# Patient Record
Sex: FEMALE | Race: WHITE | NOT HISPANIC OR LATINO | Employment: UNEMPLOYED | URBAN - METROPOLITAN AREA
[De-identification: names, ages, dates, MRNs, and addresses within clinical notes are randomized per-mention and may not be internally consistent; named-entity substitution may affect disease eponyms.]

---

## 2023-10-25 ENCOUNTER — LAB (OUTPATIENT)
Dept: LAB | Facility: LAB | Age: 48
End: 2023-10-25
Payer: COMMERCIAL

## 2023-10-25 DIAGNOSIS — Z13.220 ENCOUNTER FOR SCREENING FOR LIPOID DISORDERS: ICD-10-CM

## 2023-10-25 DIAGNOSIS — F41.9 ANXIETY DISORDER, UNSPECIFIED: ICD-10-CM

## 2023-10-25 DIAGNOSIS — E55.9 VITAMIN D DEFICIENCY, UNSPECIFIED: ICD-10-CM

## 2023-10-25 DIAGNOSIS — F41.9 ANXIETY DISORDER, UNSPECIFIED: Primary | ICD-10-CM

## 2023-10-25 DIAGNOSIS — Z00.00 ENCOUNTER FOR GENERAL ADULT MEDICAL EXAMINATION WITHOUT ABNORMAL FINDINGS: ICD-10-CM

## 2023-10-25 LAB
25(OH)D3 SERPL-MCNC: 20 NG/ML (ref 30–100)
ALBUMIN SERPL BCP-MCNC: 4.5 G/DL (ref 3.4–5)
ALP SERPL-CCNC: 85 U/L (ref 33–110)
ALT SERPL W P-5'-P-CCNC: 14 U/L (ref 7–45)
ANION GAP SERPL CALC-SCNC: 12 MMOL/L (ref 10–20)
AST SERPL W P-5'-P-CCNC: 18 U/L (ref 9–39)
BASOPHILS # BLD AUTO: 0.05 X10*3/UL (ref 0–0.1)
BASOPHILS NFR BLD AUTO: 0.6 %
BILIRUB SERPL-MCNC: 0.6 MG/DL (ref 0–1.2)
BUN SERPL-MCNC: 16 MG/DL (ref 6–23)
CALCIUM SERPL-MCNC: 9.2 MG/DL (ref 8.6–10.3)
CHLORIDE SERPL-SCNC: 108 MMOL/L (ref 98–107)
CHOLEST SERPL-MCNC: 200 MG/DL (ref 0–199)
CHOLESTEROL/HDL RATIO: 3.4
CO2 SERPL-SCNC: 25 MMOL/L (ref 21–32)
CREAT SERPL-MCNC: 0.74 MG/DL (ref 0.5–1.05)
EOSINOPHIL # BLD AUTO: 0.19 X10*3/UL (ref 0–0.7)
EOSINOPHIL NFR BLD AUTO: 2.3 %
ERYTHROCYTE [DISTWIDTH] IN BLOOD BY AUTOMATED COUNT: 14.1 % (ref 11.5–14.5)
GFR SERPL CREATININE-BSD FRML MDRD: >90 ML/MIN/1.73M*2
GLUCOSE SERPL-MCNC: 90 MG/DL (ref 74–99)
HCT VFR BLD AUTO: 43.9 % (ref 36–46)
HDLC SERPL-MCNC: 58.8 MG/DL
HGB BLD-MCNC: 14.9 G/DL (ref 12–16)
IMM GRANULOCYTES # BLD AUTO: 0.02 X10*3/UL (ref 0–0.7)
IMM GRANULOCYTES NFR BLD AUTO: 0.2 % (ref 0–0.9)
LDLC SERPL CALC-MCNC: 129 MG/DL
LYMPHOCYTES # BLD AUTO: 2.56 X10*3/UL (ref 1.2–4.8)
LYMPHOCYTES NFR BLD AUTO: 30.6 %
MCH RBC QN AUTO: 31.2 PG (ref 26–34)
MCHC RBC AUTO-ENTMCNC: 33.9 G/DL (ref 32–36)
MCV RBC AUTO: 92 FL (ref 80–100)
MONOCYTES # BLD AUTO: 0.35 X10*3/UL (ref 0.1–1)
MONOCYTES NFR BLD AUTO: 4.2 %
NEUTROPHILS # BLD AUTO: 5.19 X10*3/UL (ref 1.2–7.7)
NEUTROPHILS NFR BLD AUTO: 62.1 %
NON HDL CHOLESTEROL: 141 MG/DL (ref 0–149)
NRBC BLD-RTO: 0 /100 WBCS (ref 0–0)
PLATELET # BLD AUTO: 243 X10*3/UL (ref 150–450)
PMV BLD AUTO: 9.4 FL (ref 7.5–11.5)
POTASSIUM SERPL-SCNC: 3.9 MMOL/L (ref 3.5–5.3)
PROT SERPL-MCNC: 6.9 G/DL (ref 6.4–8.2)
RBC # BLD AUTO: 4.78 X10*6/UL (ref 4–5.2)
SODIUM SERPL-SCNC: 141 MMOL/L (ref 136–145)
TRIGL SERPL-MCNC: 63 MG/DL (ref 0–149)
TSH SERPL-ACNC: 0.89 MIU/L (ref 0.44–3.98)
VIT B12 SERPL-MCNC: 294 PG/ML (ref 211–911)
VLDL: 13 MG/DL (ref 0–40)
WBC # BLD AUTO: 8.4 X10*3/UL (ref 4.4–11.3)

## 2023-10-25 PROCEDURE — 36415 COLL VENOUS BLD VENIPUNCTURE: CPT

## 2023-10-25 PROCEDURE — 84443 ASSAY THYROID STIM HORMONE: CPT

## 2023-10-25 PROCEDURE — 80061 LIPID PANEL: CPT

## 2023-10-25 PROCEDURE — 85025 COMPLETE CBC W/AUTO DIFF WBC: CPT

## 2023-10-25 PROCEDURE — 82306 VITAMIN D 25 HYDROXY: CPT

## 2023-10-25 PROCEDURE — 82607 VITAMIN B-12: CPT

## 2023-10-25 PROCEDURE — 80053 COMPREHEN METABOLIC PANEL: CPT

## 2023-11-01 ENCOUNTER — OFFICE VISIT (OUTPATIENT)
Dept: PRIMARY CARE | Facility: CLINIC | Age: 48
End: 2023-11-01
Payer: COMMERCIAL

## 2023-11-01 VITALS
HEIGHT: 64 IN | HEART RATE: 97 BPM | SYSTOLIC BLOOD PRESSURE: 108 MMHG | BODY MASS INDEX: 21.65 KG/M2 | RESPIRATION RATE: 16 BRPM | OXYGEN SATURATION: 98 % | WEIGHT: 126.8 LBS | DIASTOLIC BLOOD PRESSURE: 68 MMHG

## 2023-11-01 DIAGNOSIS — Z13.1 SCREENING FOR DIABETES MELLITUS: ICD-10-CM

## 2023-11-01 DIAGNOSIS — K21.9 GASTROESOPHAGEAL REFLUX DISEASE WITHOUT ESOPHAGITIS: ICD-10-CM

## 2023-11-01 DIAGNOSIS — F41.9 ANXIETY AND DEPRESSION: ICD-10-CM

## 2023-11-01 DIAGNOSIS — R79.89 LOW VITAMIN B12 LEVEL: ICD-10-CM

## 2023-11-01 DIAGNOSIS — E78.5 HYPERLIPIDEMIA, UNSPECIFIED HYPERLIPIDEMIA TYPE: ICD-10-CM

## 2023-11-01 DIAGNOSIS — E55.9 VITAMIN D DEFICIENCY: ICD-10-CM

## 2023-11-01 DIAGNOSIS — F32.A ANXIETY AND DEPRESSION: ICD-10-CM

## 2023-11-01 DIAGNOSIS — F17.210 TOBACCO DEPENDENCE DUE TO CIGARETTES: ICD-10-CM

## 2023-11-01 DIAGNOSIS — Z00.00 ANNUAL PHYSICAL EXAM: Primary | ICD-10-CM

## 2023-11-01 DIAGNOSIS — D64.9 ANEMIA, UNSPECIFIED TYPE: ICD-10-CM

## 2023-11-01 DIAGNOSIS — Z13.29 SCREENING FOR THYROID DISORDER: ICD-10-CM

## 2023-11-01 PROBLEM — G89.29 CHRONIC HEADACHES: Status: ACTIVE | Noted: 2023-11-01

## 2023-11-01 PROBLEM — G25.81 RESTLESS LEGS SYNDROME (RLS): Status: ACTIVE | Noted: 2023-11-01

## 2023-11-01 PROBLEM — J30.2 SEASONAL ALLERGIES: Status: ACTIVE | Noted: 2023-11-01

## 2023-11-01 PROBLEM — R51.9 CHRONIC HEADACHES: Status: ACTIVE | Noted: 2023-11-01

## 2023-11-01 PROBLEM — K12.2 INFECTION OF MOUTH: Status: ACTIVE | Noted: 2023-11-01

## 2023-11-01 PROBLEM — B35.1 ONYCHOMYCOSIS OF TOENAIL: Status: ACTIVE | Noted: 2023-11-01

## 2023-11-01 PROCEDURE — 4004F PT TOBACCO SCREEN RCVD TLK: CPT | Performed by: NURSE PRACTITIONER

## 2023-11-01 PROCEDURE — 99406 BEHAV CHNG SMOKING 3-10 MIN: CPT | Performed by: NURSE PRACTITIONER

## 2023-11-01 PROCEDURE — 99396 PREV VISIT EST AGE 40-64: CPT | Performed by: NURSE PRACTITIONER

## 2023-11-01 PROCEDURE — 99214 OFFICE O/P EST MOD 30 MIN: CPT | Performed by: NURSE PRACTITIONER

## 2023-11-01 RX ORDER — IBUPROFEN 600 MG/1
600 TABLET ORAL 4 TIMES DAILY
COMMUNITY
Start: 2020-12-01

## 2023-11-01 RX ORDER — ACETAMINOPHEN 500 MG
1000 TABLET ORAL EVERY 4 HOURS PRN
COMMUNITY
Start: 2011-03-04

## 2023-11-01 ASSESSMENT — ANXIETY QUESTIONNAIRES
IF YOU CHECKED OFF ANY PROBLEMS ON THIS QUESTIONNAIRE, HOW DIFFICULT HAVE THESE PROBLEMS MADE IT FOR YOU TO DO YOUR WORK, TAKE CARE OF THINGS AT HOME, OR GET ALONG WITH OTHER PEOPLE: NOT DIFFICULT AT ALL
3. WORRYING TOO MUCH ABOUT DIFFERENT THINGS: NOT AT ALL
6. BECOMING EASILY ANNOYED OR IRRITABLE: NOT AT ALL
1. FEELING NERVOUS, ANXIOUS, OR ON EDGE: NOT AT ALL
4. TROUBLE RELAXING: NOT AT ALL
7. FEELING AFRAID AS IF SOMETHING AWFUL MIGHT HAPPEN: NOT AT ALL
5. BEING SO RESTLESS THAT IT IS HARD TO SIT STILL: NOT AT ALL
2. NOT BEING ABLE TO STOP OR CONTROL WORRYING: NOT AT ALL
GAD7 TOTAL SCORE: 0

## 2023-11-01 ASSESSMENT — COLUMBIA-SUICIDE SEVERITY RATING SCALE - C-SSRS
6. HAVE YOU EVER DONE ANYTHING, STARTED TO DO ANYTHING, OR PREPARED TO DO ANYTHING TO END YOUR LIFE?: NO
2. HAVE YOU ACTUALLY HAD ANY THOUGHTS OF KILLING YOURSELF?: NO
1. IN THE PAST MONTH, HAVE YOU WISHED YOU WERE DEAD OR WISHED YOU COULD GO TO SLEEP AND NOT WAKE UP?: NO

## 2023-11-01 ASSESSMENT — PATIENT HEALTH QUESTIONNAIRE - PHQ9
1. LITTLE INTEREST OR PLEASURE IN DOING THINGS: NOT AT ALL
2. FEELING DOWN, DEPRESSED OR HOPELESS: NOT AT ALL
SUM OF ALL RESPONSES TO PHQ9 QUESTIONS 1 AND 2: 0

## 2023-11-01 ASSESSMENT — ENCOUNTER SYMPTOMS
DEPRESSION: 0
OCCASIONAL FEELINGS OF UNSTEADINESS: 0
LOSS OF SENSATION IN FEET: 0

## 2023-11-01 NOTE — PATIENT INSTRUCTIONS
Start vitamin B12 500mcg daily and vitamin D 10,000 units weekly. Complete labs a week prior to one year follow up.

## 2023-11-01 NOTE — PROGRESS NOTES
"Subjective   Patient ID: Svetlana Nobles is a 48 y.o. female who presents for Well Women Visit.    Patient is following up for annual physical exam and lab results review.  Lab results shows low vitamin D level at 20, low normal vitamin B12 level at 294, slightly elevated LDL cholesterol 129.  She is an everyday cigarette smoker not willing to quit.  Advises that she will establish with OB/GYN for routine female exam and testing.  Advises she will consider screening for colorectal cancer when she turns 51.  Advises she has no acute medical complaint.         Review of Systems   All other systems reviewed and are negative.      Objective   /68   Pulse 97   Resp 16   Ht 1.626 m (5' 4\")   Wt 57.5 kg (126 lb 12.8 oz)   SpO2 98%   BMI 21.77 kg/m²     Physical Exam  Constitutional:       Appearance: Normal appearance. She is normal weight.   HENT:      Head: Normocephalic and atraumatic.      Right Ear: External ear normal.      Left Ear: External ear normal.      Nose: Nose normal.      Mouth/Throat:      Mouth: Mucous membranes are moist.   Cardiovascular:      Rate and Rhythm: Normal rate and regular rhythm.      Pulses: Normal pulses.      Heart sounds: Normal heart sounds.   Pulmonary:      Effort: Pulmonary effort is normal.      Breath sounds: Normal breath sounds.   Abdominal:      General: Abdomen is flat. Bowel sounds are normal.      Palpations: Abdomen is soft.   Musculoskeletal:      Cervical back: Neck supple.   Skin:     General: Skin is warm and dry.   Neurological:      General: No focal deficit present.      Mental Status: She is alert and oriented to person, place, and time.   Psychiatric:         Mood and Affect: Mood normal.         Behavior: Behavior normal.         Thought Content: Thought content normal.         Judgment: Judgment normal.         Assessment/Plan   Problem List Items Addressed This Visit    None  Visit Diagnoses       Vitamin D deficiency    -  Primary    Relevant Orders    " Vitamin B12    Low vitamin B12 level        Relevant Orders    Vitamin D 25-Hydroxy,Total (for eval of Vitamin D levels)    Hyperlipidemia, unspecified hyperlipidemia type        Relevant Orders    Lipid panel    Screening for thyroid disorder        Relevant Orders    TSH with reflex to Free T4 if abnormal    Anemia, unspecified type        Relevant Orders    CBC and Auto Differential    Screening for diabetes mellitus        Relevant Orders    Comprehensive Metabolic Panel    Annual physical exam        Relevant Orders    Follow Up In [facility] - PCP - Established

## 2024-05-23 ENCOUNTER — HOSPITAL ENCOUNTER (OUTPATIENT)
Dept: RADIOLOGY | Facility: CLINIC | Age: 49
Discharge: HOME | End: 2024-05-23
Payer: COMMERCIAL

## 2024-05-23 ENCOUNTER — OFFICE VISIT (OUTPATIENT)
Dept: PRIMARY CARE | Facility: CLINIC | Age: 49
End: 2024-05-23
Payer: COMMERCIAL

## 2024-05-23 VITALS
RESPIRATION RATE: 16 BRPM | HEIGHT: 64 IN | OXYGEN SATURATION: 98 % | DIASTOLIC BLOOD PRESSURE: 72 MMHG | WEIGHT: 124.8 LBS | SYSTOLIC BLOOD PRESSURE: 122 MMHG | HEART RATE: 88 BPM | BODY MASS INDEX: 21.31 KG/M2

## 2024-05-23 DIAGNOSIS — R11.2 NAUSEA AND VOMITING, UNSPECIFIED VOMITING TYPE: ICD-10-CM

## 2024-05-23 DIAGNOSIS — K21.9 GASTROESOPHAGEAL REFLUX DISEASE WITHOUT ESOPHAGITIS: Primary | ICD-10-CM

## 2024-05-23 DIAGNOSIS — R11.2 NAUSEA AND VOMITING, UNSPECIFIED VOMITING TYPE: Primary | ICD-10-CM

## 2024-05-23 DIAGNOSIS — Z00.00 ANNUAL PHYSICAL EXAM: ICD-10-CM

## 2024-05-23 PROCEDURE — 4004F PT TOBACCO SCREEN RCVD TLK: CPT | Performed by: NURSE PRACTITIONER

## 2024-05-23 PROCEDURE — 2550000001 HC RX 255 CONTRASTS: Performed by: NURSE PRACTITIONER

## 2024-05-23 PROCEDURE — 99213 OFFICE O/P EST LOW 20 MIN: CPT | Performed by: NURSE PRACTITIONER

## 2024-05-23 PROCEDURE — 74150 CT ABDOMEN W/O CONTRAST: CPT | Performed by: RADIOLOGY

## 2024-05-23 PROCEDURE — 74150 CT ABDOMEN W/O CONTRAST: CPT

## 2024-05-23 PROCEDURE — A9698 NON-RAD CONTRAST MATERIALNOC: HCPCS | Performed by: NURSE PRACTITIONER

## 2024-05-23 RX ORDER — PANTOPRAZOLE SODIUM 40 MG/1
40 TABLET, DELAYED RELEASE ORAL 2 TIMES DAILY
Qty: 84 TABLET | Refills: 0 | Status: SHIPPED | OUTPATIENT
Start: 2024-05-23 | End: 2024-07-04

## 2024-05-23 RX ADMIN — IOHEXOL 500 ML: 12 SOLUTION ORAL at 11:36

## 2024-05-23 ASSESSMENT — ANXIETY QUESTIONNAIRES
GAD7 TOTAL SCORE: 0
3. WORRYING TOO MUCH ABOUT DIFFERENT THINGS: NOT AT ALL
1. FEELING NERVOUS, ANXIOUS, OR ON EDGE: NOT AT ALL
IF YOU CHECKED OFF ANY PROBLEMS ON THIS QUESTIONNAIRE, HOW DIFFICULT HAVE THESE PROBLEMS MADE IT FOR YOU TO DO YOUR WORK, TAKE CARE OF THINGS AT HOME, OR GET ALONG WITH OTHER PEOPLE: NOT DIFFICULT AT ALL
4. TROUBLE RELAXING: NOT AT ALL
6. BECOMING EASILY ANNOYED OR IRRITABLE: NOT AT ALL
5. BEING SO RESTLESS THAT IT IS HARD TO SIT STILL: NOT AT ALL
7. FEELING AFRAID AS IF SOMETHING AWFUL MIGHT HAPPEN: NOT AT ALL
2. NOT BEING ABLE TO STOP OR CONTROL WORRYING: NOT AT ALL

## 2024-05-23 ASSESSMENT — ENCOUNTER SYMPTOMS
DEPRESSION: 0
VOMITING: 1
LOSS OF SENSATION IN FEET: 0
OCCASIONAL FEELINGS OF UNSTEADINESS: 0
NAUSEA: 1

## 2024-05-23 ASSESSMENT — PATIENT HEALTH QUESTIONNAIRE - PHQ9
2. FEELING DOWN, DEPRESSED OR HOPELESS: NOT AT ALL
1. LITTLE INTEREST OR PLEASURE IN DOING THINGS: NOT AT ALL
SUM OF ALL RESPONSES TO PHQ9 QUESTIONS 1 AND 2: 0

## 2024-05-23 ASSESSMENT — COLUMBIA-SUICIDE SEVERITY RATING SCALE - C-SSRS
2. HAVE YOU ACTUALLY HAD ANY THOUGHTS OF KILLING YOURSELF?: NO
1. IN THE PAST MONTH, HAVE YOU WISHED YOU WERE DEAD OR WISHED YOU COULD GO TO SLEEP AND NOT WAKE UP?: NO
6. HAVE YOU EVER DONE ANYTHING, STARTED TO DO ANYTHING, OR PREPARED TO DO ANYTHING TO END YOUR LIFE?: NO

## 2024-05-23 NOTE — PROGRESS NOTES
"Subjective   Patient ID: Svetlana Nobles is a 48 y.o. female who presents for ER Follow-up.    Patient is presenting with complaint of nausea and vomiting that started end of March of this year.  Of note, she was in the emergency department at the [facility] on 4/2/2024 with complaint of nausea and fatigue, chest x-ray completed during emergency department visit was suggestive of COPD.  All other workup were unremarkable.  Advises that she has been having persistent nausea associated with intermittent vomiting for about a couple of months.  Patient reports her symptoms are mostly in the morning when she gets up from bed and will persist until about noon.  The beginning, it was occurring on some days, but has been persistent for the past couple of weeks.  She denies loss of appetite, abdominal pain or changes in bowel movement.  She denies any other associated symptoms.         Review of Systems   Gastrointestinal:  Positive for nausea and vomiting.       Objective   /72   Pulse 88   Resp 16   Ht 1.626 m (5' 4\")   Wt 56.6 kg (124 lb 12.8 oz)   SpO2 98%   BMI 21.42 kg/m²     Physical Exam  Constitutional:       Appearance: Normal appearance.   HENT:      Head: Normocephalic and atraumatic.      Right Ear: External ear normal.      Left Ear: External ear normal.      Nose: Nose normal.      Mouth/Throat:      Mouth: Mucous membranes are moist.   Cardiovascular:      Rate and Rhythm: Normal rate and regular rhythm.      Pulses: Normal pulses.      Heart sounds: Normal heart sounds.   Pulmonary:      Effort: Pulmonary effort is normal.      Breath sounds: Normal breath sounds.   Abdominal:      General: Abdomen is flat. Bowel sounds are normal.      Palpations: Abdomen is soft.   Musculoskeletal:      Cervical back: Neck supple.   Skin:     General: Skin is warm and dry.   Neurological:      General: No focal deficit present.      Mental Status: She is alert and oriented to person, place, and time. "   Psychiatric:         Mood and Affect: Mood normal.         Behavior: Behavior normal.         Thought Content: Thought content normal.         Judgment: Judgment normal.         Assessment/Plan   Problem List Items Addressed This Visit    None

## 2024-05-24 ENCOUNTER — TELEPHONE (OUTPATIENT)
Dept: PRIMARY CARE | Facility: CLINIC | Age: 49
End: 2024-05-24
Payer: COMMERCIAL

## 2024-05-24 NOTE — TELEPHONE ENCOUNTER
----- Message from KELLY Almaraz-CNP sent at 5/23/2024  7:40 PM EDT -----  Please tell patient that her abdominal CT scan is normal.  I have prescribed pantoprazole 40 mg twice daily for 6 weeks.  We will consider referral to gastroenterology if no improvement of symptoms after a week.

## 2024-05-24 NOTE — TELEPHONE ENCOUNTER
Lara called for Svetlana due to her some testing done and they didn't like the results. The report said there was nothing wrong. Svetlana is still very sick and Lara wants a sooner appointment for her and demanded Bartolo call her back

## 2024-10-31 NOTE — PROGRESS NOTES
"Svetlana Nobles is a 49 y.o. female who is here for a new routine exam. With problem   PCP = KELLY Almaraz-CNP  She states that she stopped periods at age 42 years, denies any family H/o early menopause  Denies any family H/o gyn cancer or colon cancer  APE Concerns: c/o feeling cold all the time, dry scalp and skin, she has also has lost about 10 to 20 lbs in the last 6 months, without trying to lose weight  She has never had a mammogram or a screening colonoscopy  Other Concerns: Denies any urinary problems  Preventative:  Last mammogram: none [unfilled] [unfilled]   Last Colonoscopy: None    Seat Belt Use: always    GynHx:      Social History     Substance and Sexual Activity   Sexual Activity Not on file     Current contraception: None  STIs: none  Last PAP:       SoHx:  Substance:   Tobacco Use: High Risk (11/4/2024)    Patient History     Smoking Tobacco Use: Every Day     Smokeless Tobacco Use: Never     Passive Exposure: Not on file      Social History     Substance and Sexual Activity   Drug Use Never      Social History     Substance and Sexual Activity   Alcohol Use Never       Past Medical History:   Diagnosis Date    Benign intracranial hypertension 12/01/2020    Pseudotumor cerebri    Narcolepsy without cataplexy ([facility]) 12/01/2020    Narcolepsy    Personal history of other complications of pregnancy, childbirth and the puerperium 12/01/2020    History of postpartum depression    Personal history of other diseases of the digestive system 12/01/2020    History of irritable bowel syndrome    Personal history of other endocrine, nutritional and metabolic disease 12/01/2020    History of Graves' disease      History reviewed. No pertinent surgical history.   Objective   BP (!) 160/102   Ht 1.613 m (5' 3.5\")   Wt 54.4 kg (120 lb)   LMP 10/01/2017 (Approximate)   BMI 20.92 kg/m²      General:   Alert and oriented, in no acute distress   Neck: Supple. No visible thyromegaly.  "   Breast/Axilla: Normal to palpation bilaterally without masses, skin changes, or nipple discharge.    Abdomen: Soft, non-tender, without masses or organomegaly   Vulva: Normal architecture without erythema, masses, or lesions.    Vagina: Normal mucosa without lesions, masses, or atrophy. No abnormal vaginal discharge.    Cervix: Normal without masses, lesions, or signs of cervicitis.    Uterus: Normal mobile, non-enlarged uterus    Adnexa: Normal without masses or lesions   Pelvic Floor No POP noted. No high tone pelvic floor    Psych Normal affect. Normal mood.      Problem List Items Addressed This Visit       Amenorrhea, secondary - Primary    Encounter for gynecological examination with abnormal finding    Fatigue    Premature menopause    Tobacco abuse disorder     Other Visit Diagnoses       Amenorrhea        Relevant Orders    17-Hydroxyprogesterone    DHEA-Sulfate    Estradiol    Follicle Stimulating Hormone    Hemoglobin A1C (Completed)    Human Chorionic Gonadotropin, Serum Quantitative (Completed)    Insulin, Fasting    Luteinizing Hormone    Prolactin    Testosterone,Free and Total    Thyroid Stimulating Hormone (Completed)    Thyroxine, Free (Completed)    Triiodothyronine, Free    US PELVIS TRANSABDOMINAL WITH TRANSVAGINAL    Vitamin D 25-Hydroxy,Total (for eval of Vitamin D levels) (Completed)    CBC (Completed)    Vitamin B12 (Completed)    Encounter for screening colonoscopy        Relevant Orders    Colonoscopy Screening; Average Risk Patient    Screening mammogram for breast cancer        Relevant Orders    BI mammo bilateral screening tomosynthesis    Encounter for well woman exam with routine gynecological exam        Relevant Orders    THINPREP PAP TEST (>30)    Screening for cervical cancer        Relevant Orders    THINPREP PAP TEST (>30)    Screening for human papillomavirus (HPV)        Relevant Orders    THINPREP PAP TEST (>30)           Assessment/Plan    Thank you for coming to your  annual exam. Your findings during the exam were abnormal  Specific topics addressed during this exam included: healthy lifestyle, well woman screening guidelines,  Healthy diet with high fiber, Weight bearing exercise 3 to 5 times a week, Multivitamins and calcium     Actions performed during this visit include:  - Clinical breast exam  - Clinical pelvic exam  -Pap and HPV ordered.  -Mammogram ordered  -Screening colonoscopy referral done  -Labs and pelvic ultrasound ordered to evaluate secondary amenorrhea and confirmed premature menopause  -In 4 to 6 weeks to discuss ultrasound and lab results  Orders Placed This Encounter   Procedures    US PELVIS TRANSABDOMINAL WITH TRANSVAGINAL     Standing Status:   Future     Standing Expiration Date:   11/4/2025     Order Specific Question:   Is the patient pregnant?     Answer:   No     Order Specific Question:   Reason for exam:     Answer:   PCOS work up     Order Specific Question:   Radiologist to Determine Optimal Study     Answer:   Yes     Order Specific Question:   Release result to [employer]     Answer:   Immediate [1]     Order Specific Question:   Is this exam part of a Research Study? If Yes, link this order to the research study     Answer:   No    BI mammo bilateral screening tomosynthesis     Standing Status:   Future     Standing Expiration Date:   1/4/2026     Order Specific Question:   Is the patient pregnant?     Answer:   No     Order Specific Question:   Reason for exam:     Answer:   screening mammogram     Order Specific Question:   Radiologist to Determine Optimal Study     Answer:   Yes     Order Specific Question:   Release result to [employer]     Answer:   Immediate [1]     Order Specific Question:   Is this exam part of a Research Study? If Yes, link this order to the research study     Answer:   No    17-Hydroxyprogesterone     Standing Status:   Future     Number of Occurrences:   1     Standing Expiration Date:   11/4/2025     Order Specific  Question:   Release result to MyChart     Answer:   Immediate [1]    DHEA-Sulfate     Standing Status:   Future     Number of Occurrences:   1     Standing Expiration Date:   11/4/2025     Order Specific Question:   Release result to MyChart     Answer:   Immediate [1]    Estradiol     Standing Status:   Future     Number of Occurrences:   1     Standing Expiration Date:   11/4/2025     Order Specific Question:   Release result to MyChart     Answer:   Immediate [1]    Follicle Stimulating Hormone     Standing Status:   Future     Number of Occurrences:   1     Standing Expiration Date:   11/4/2025     Order Specific Question:   Release result to MyChart     Answer:   Immediate [1]    Hemoglobin A1C     Standing Status:   Future     Number of Occurrences:   1     Standing Expiration Date:   11/4/2025     Order Specific Question:   Release result to MyChart     Answer:   Immediate [1]    Human Chorionic Gonadotropin, Serum Quantitative     Standing Status:   Future     Number of Occurrences:   1     Standing Expiration Date:   11/4/2025     Order Specific Question:   Release result to MyChart     Answer:   Immediate [1]    Insulin, Fasting     Standing Status:   Future     Number of Occurrences:   1     Standing Expiration Date:   11/4/2025     Order Specific Question:   Release result to MyChart     Answer:   Immediate [1]    Luteinizing Hormone     Standing Status:   Future     Number of Occurrences:   1     Standing Expiration Date:   11/4/2025     Order Specific Question:   Release result to MyChart     Answer:   Immediate [1]    Prolactin     Standing Status:   Future     Number of Occurrences:   1     Standing Expiration Date:   11/4/2025     Order Specific Question:   Release result to MyChart     Answer:   Immediate [1]    Testosterone,Free and Total     Standing Status:   Future     Number of Occurrences:   1     Standing Expiration Date:   11/4/2025     Order Specific Question:   Release result to MyChart      Answer:   Immediate [1]    Thyroid Stimulating Hormone     Standing Status:   Future     Number of Occurrences:   1     Standing Expiration Date:   11/4/2025     Order Specific Question:   Release result to [facility]     Answer:   Immediate [1]    Thyroxine, Free     Standing Status:   Future     Number of Occurrences:   1     Standing Expiration Date:   11/4/2025     Order Specific Question:   [facility]     Answer:   Immediate [1]    Triiodothyronine, Free     Standing Status:   Future     Number of Occurrences:   1     Standing Expiration Date:   11/4/2025     Order Specific Question:   Release result to [facility]     Answer:   Immediate [1]    Vitamin D 25-Hydroxy,Total (for eval of Vitamin D levels)     Standing Status:   Future     Number of Occurrences:   1     Standing Expiration Date:   11/4/2025     Order Specific Question:   Release result to [facility]     Answer:   Immediate [1]    CBC     Standing Status:   Future     Number of Occurrences:   1     Standing Expiration Date:   11/4/2025     Order Specific Question:   Release result to [facility]     Answer:   Immediate [1]    Vitamin B12     Standing Status:   Future     Number of Occurrences:   1     Standing Expiration Date:   11/4/2025     Order Specific Question:   Release result to [facility]     Answer:   Immediate [1]

## 2024-11-01 ENCOUNTER — APPOINTMENT (OUTPATIENT)
Dept: PRIMARY CARE | Facility: CLINIC | Age: 49
End: 2024-11-01
Payer: COMMERCIAL

## 2024-11-04 ENCOUNTER — LAB (OUTPATIENT)
Dept: LAB | Facility: LAB | Age: 49
End: 2024-11-04
Payer: COMMERCIAL

## 2024-11-04 ENCOUNTER — APPOINTMENT (OUTPATIENT)
Dept: OBSTETRICS AND GYNECOLOGY | Facility: CLINIC | Age: 49
End: 2024-11-04
Payer: COMMERCIAL

## 2024-11-04 VITALS
SYSTOLIC BLOOD PRESSURE: 160 MMHG | BODY MASS INDEX: 20.49 KG/M2 | WEIGHT: 120 LBS | DIASTOLIC BLOOD PRESSURE: 102 MMHG | HEIGHT: 64 IN

## 2024-11-04 DIAGNOSIS — Z01.419 ENCOUNTER FOR WELL WOMAN EXAM WITH ROUTINE GYNECOLOGICAL EXAM: ICD-10-CM

## 2024-11-04 DIAGNOSIS — Z11.51 SCREENING FOR HUMAN PAPILLOMAVIRUS (HPV): ICD-10-CM

## 2024-11-04 DIAGNOSIS — Z12.4 SCREENING FOR CERVICAL CANCER: ICD-10-CM

## 2024-11-04 DIAGNOSIS — R79.89 ELEVATED SERUM HCG: ICD-10-CM

## 2024-11-04 DIAGNOSIS — R53.83 OTHER FATIGUE: ICD-10-CM

## 2024-11-04 DIAGNOSIS — N91.2 AMENORRHEA: ICD-10-CM

## 2024-11-04 DIAGNOSIS — Z01.411 ENCOUNTER FOR GYNECOLOGICAL EXAMINATION WITH ABNORMAL FINDING: ICD-10-CM

## 2024-11-04 DIAGNOSIS — E28.319 PREMATURE MENOPAUSE: ICD-10-CM

## 2024-11-04 DIAGNOSIS — Z12.11 ENCOUNTER FOR SCREENING COLONOSCOPY: ICD-10-CM

## 2024-11-04 DIAGNOSIS — N91.1 AMENORRHEA, SECONDARY: Primary | ICD-10-CM

## 2024-11-04 DIAGNOSIS — Z72.0 TOBACCO ABUSE DISORDER: ICD-10-CM

## 2024-11-04 DIAGNOSIS — Z12.31 SCREENING MAMMOGRAM FOR BREAST CANCER: ICD-10-CM

## 2024-11-04 PROBLEM — R68.89 COLD INTOLERANCE: Status: ACTIVE | Noted: 2024-11-04

## 2024-11-04 LAB
25(OH)D3 SERPL-MCNC: 18 NG/ML (ref 30–100)
B-HCG SERPL-ACNC: 7 MIU/ML
DHEA-S SERPL-MCNC: 99 UG/DL (ref 12–379)
ERYTHROCYTE [DISTWIDTH] IN BLOOD BY AUTOMATED COUNT: 13.6 % (ref 11.5–14.5)
EST. AVERAGE GLUCOSE BLD GHB EST-MCNC: 108 MG/DL
ESTRADIOL SERPL-MCNC: 38 PG/ML
FSH SERPL-ACNC: 75.3 IU/L
HBA1C MFR BLD: 5.4 %
HCT VFR BLD AUTO: 46.6 % (ref 36–46)
HGB BLD-MCNC: 15.6 G/DL (ref 12–16)
INSULIN P FAST SERPL-ACNC: 5 UIU/ML (ref 3–25)
LH SERPL-ACNC: 60.4 IU/L
MCH RBC QN AUTO: 31 PG (ref 26–34)
MCHC RBC AUTO-ENTMCNC: 33.5 G/DL (ref 32–36)
MCV RBC AUTO: 93 FL (ref 80–100)
NRBC BLD-RTO: 0 /100 WBCS (ref 0–0)
PLATELET # BLD AUTO: 259 X10*3/UL (ref 150–450)
PROLACTIN SERPL-MCNC: 5.7 UG/L (ref 3–20)
RBC # BLD AUTO: 5.04 X10*6/UL (ref 4–5.2)
T3FREE SERPL-MCNC: 3.4 PG/ML (ref 2.3–4.2)
T4 FREE SERPL-MCNC: 0.83 NG/DL (ref 0.61–1.12)
TSH SERPL-ACNC: 1.01 MIU/L (ref 0.44–3.98)
VIT B12 SERPL-MCNC: 236 PG/ML (ref 211–911)
WBC # BLD AUTO: 7.1 X10*3/UL (ref 4.4–11.3)

## 2024-11-04 PROCEDURE — 82306 VITAMIN D 25 HYDROXY: CPT

## 2024-11-04 PROCEDURE — 83498 ASY HYDROXYPROGESTERONE 17-D: CPT

## 2024-11-04 PROCEDURE — 99386 PREV VISIT NEW AGE 40-64: CPT | Performed by: OBSTETRICS & GYNECOLOGY

## 2024-11-04 PROCEDURE — 82627 DEHYDROEPIANDROSTERONE: CPT

## 2024-11-04 PROCEDURE — 87624 HPV HI-RISK TYP POOLED RSLT: CPT

## 2024-11-04 PROCEDURE — 83525 ASSAY OF INSULIN: CPT

## 2024-11-04 PROCEDURE — 82607 VITAMIN B-12: CPT

## 2024-11-04 PROCEDURE — 4004F PT TOBACCO SCREEN RCVD TLK: CPT | Performed by: OBSTETRICS & GYNECOLOGY

## 2024-11-04 PROCEDURE — 83002 ASSAY OF GONADOTROPIN (LH): CPT

## 2024-11-04 PROCEDURE — 3008F BODY MASS INDEX DOCD: CPT | Performed by: OBSTETRICS & GYNECOLOGY

## 2024-11-04 PROCEDURE — 84402 ASSAY OF FREE TESTOSTERONE: CPT

## 2024-11-04 PROCEDURE — 84481 FREE ASSAY (FT-3): CPT

## 2024-11-04 PROCEDURE — 84702 CHORIONIC GONADOTROPIN TEST: CPT

## 2024-11-04 PROCEDURE — 84443 ASSAY THYROID STIM HORMONE: CPT

## 2024-11-04 PROCEDURE — 84439 ASSAY OF FREE THYROXINE: CPT

## 2024-11-04 PROCEDURE — 85027 COMPLETE CBC AUTOMATED: CPT

## 2024-11-04 PROCEDURE — 36415 COLL VENOUS BLD VENIPUNCTURE: CPT

## 2024-11-04 PROCEDURE — 83036 HEMOGLOBIN GLYCOSYLATED A1C: CPT

## 2024-11-04 PROCEDURE — 84146 ASSAY OF PROLACTIN: CPT

## 2024-11-04 PROCEDURE — 83001 ASSAY OF GONADOTROPIN (FSH): CPT

## 2024-11-04 PROCEDURE — 82670 ASSAY OF TOTAL ESTRADIOL: CPT

## 2024-11-06 ENCOUNTER — TELEPHONE (OUTPATIENT)
Dept: OBSTETRICS AND GYNECOLOGY | Facility: CLINIC | Age: 49
End: 2024-11-06
Payer: COMMERCIAL

## 2024-11-06 DIAGNOSIS — Z12.11 SCREEN FOR COLON CANCER: ICD-10-CM

## 2024-11-06 NOTE — TELEPHONE ENCOUNTER
----- Message from Nurse Windy HUGO sent at 11/6/2024  9:44 AM EST -----  Dr. Ramsay reviewed your recent labs results.

## 2024-11-07 ENCOUNTER — HOSPITAL ENCOUNTER (OUTPATIENT)
Dept: RADIOLOGY | Facility: CLINIC | Age: 49
Discharge: HOME | End: 2024-11-07
Payer: COMMERCIAL

## 2024-11-07 ENCOUNTER — LAB (OUTPATIENT)
Dept: LAB | Facility: LAB | Age: 49
End: 2024-11-07
Payer: COMMERCIAL

## 2024-11-07 DIAGNOSIS — N91.2 AMENORRHEA: ICD-10-CM

## 2024-11-07 DIAGNOSIS — R79.89 ELEVATED SERUM HCG: ICD-10-CM

## 2024-11-07 PROCEDURE — 76856 US EXAM PELVIC COMPLETE: CPT

## 2024-11-07 PROCEDURE — 36415 COLL VENOUS BLD VENIPUNCTURE: CPT

## 2024-11-08 LAB
17OHP SERPL-MCNC: 28.75 NG/DL
B-HCG SERPL-ACNC: 7 MIU/ML
TESTOSTERONE FREE (CHAN): 2.3 PG/ML (ref 0.1–6.4)
TESTOSTERONE,TOTAL,LC-MS/MS: 37 NG/DL (ref 2–45)

## 2024-11-11 ENCOUNTER — TELEPHONE (OUTPATIENT)
Dept: OBSTETRICS AND GYNECOLOGY | Facility: CLINIC | Age: 49
End: 2024-11-11
Payer: COMMERCIAL

## 2024-11-11 NOTE — TELEPHONE ENCOUNTER
Patient called stating she had an Ultrasound done on 11/7. [facility]. Patient asking for a call back after 4pm as she is at work wont be available. Please advise

## 2024-11-11 NOTE — TELEPHONE ENCOUNTER
Returned call and informed patient that Dr. Ramsay still needs to review results and give interpretations and arthur follow up recommendations.

## 2024-11-13 NOTE — TELEPHONE ENCOUNTER
Patient called was returning call from yesterday. Stating she wont be able to answer phone until 11:15am.

## 2024-11-22 PROBLEM — Z86.59 HISTORY OF DEPRESSION: Status: ACTIVE | Noted: 2024-11-22

## 2024-11-22 PROBLEM — G93.2 BENIGN INTRACRANIAL HYPERTENSION: Status: ACTIVE | Noted: 2024-11-22

## 2024-11-22 PROBLEM — H92.02 LEFT EAR PAIN: Status: ACTIVE | Noted: 2024-11-22

## 2024-11-22 PROBLEM — I10 BENIGN ESSENTIAL HYPERTENSION: Status: ACTIVE | Noted: 2024-11-22

## 2024-11-22 PROBLEM — G47.419 NARCOLEPSY: Status: ACTIVE | Noted: 2024-11-22

## 2024-11-22 PROBLEM — Z86.39 HISTORY OF GRAVES' DISEASE: Status: ACTIVE | Noted: 2024-11-22

## 2024-11-26 ENCOUNTER — APPOINTMENT (OUTPATIENT)
Dept: PRIMARY CARE | Facility: CLINIC | Age: 49
End: 2024-11-26
Payer: COMMERCIAL

## 2024-11-26 VITALS
HEIGHT: 64 IN | OXYGEN SATURATION: 98 % | BODY MASS INDEX: 20.32 KG/M2 | WEIGHT: 119 LBS | DIASTOLIC BLOOD PRESSURE: 94 MMHG | SYSTOLIC BLOOD PRESSURE: 162 MMHG | HEART RATE: 85 BPM

## 2024-11-26 DIAGNOSIS — Z00.00 ANNUAL PHYSICAL EXAM: Primary | ICD-10-CM

## 2024-11-26 DIAGNOSIS — F17.210 TOBACCO DEPENDENCE DUE TO CIGARETTES: ICD-10-CM

## 2024-11-26 DIAGNOSIS — I10 PRIMARY HYPERTENSION: ICD-10-CM

## 2024-11-26 DIAGNOSIS — E55.9 VITAMIN D DEFICIENCY: ICD-10-CM

## 2024-11-26 PROCEDURE — 3077F SYST BP >= 140 MM HG: CPT | Performed by: NURSE PRACTITIONER

## 2024-11-26 PROCEDURE — 3008F BODY MASS INDEX DOCD: CPT | Performed by: NURSE PRACTITIONER

## 2024-11-26 PROCEDURE — 3080F DIAST BP >= 90 MM HG: CPT | Performed by: NURSE PRACTITIONER

## 2024-11-26 PROCEDURE — 99213 OFFICE O/P EST LOW 20 MIN: CPT | Performed by: NURSE PRACTITIONER

## 2024-11-26 PROCEDURE — 99396 PREV VISIT EST AGE 40-64: CPT | Performed by: NURSE PRACTITIONER

## 2024-11-26 PROCEDURE — 99406 BEHAV CHNG SMOKING 3-10 MIN: CPT | Performed by: NURSE PRACTITIONER

## 2024-11-26 PROCEDURE — 4004F PT TOBACCO SCREEN RCVD TLK: CPT | Performed by: NURSE PRACTITIONER

## 2024-11-26 ASSESSMENT — PATIENT HEALTH QUESTIONNAIRE - PHQ9
SUM OF ALL RESPONSES TO PHQ9 QUESTIONS 1 AND 2: 0
2. FEELING DOWN, DEPRESSED OR HOPELESS: NOT AT ALL
1. LITTLE INTEREST OR PLEASURE IN DOING THINGS: NOT AT ALL

## 2024-11-26 ASSESSMENT — COLUMBIA-SUICIDE SEVERITY RATING SCALE - C-SSRS
6. HAVE YOU EVER DONE ANYTHING, STARTED TO DO ANYTHING, OR PREPARED TO DO ANYTHING TO END YOUR LIFE?: NO
1. IN THE PAST MONTH, HAVE YOU WISHED YOU WERE DEAD OR WISHED YOU COULD GO TO SLEEP AND NOT WAKE UP?: NO
2. HAVE YOU ACTUALLY HAD ANY THOUGHTS OF KILLING YOURSELF?: NO

## 2024-11-26 ASSESSMENT — ENCOUNTER SYMPTOMS
LOSS OF SENSATION IN FEET: 0
OCCASIONAL FEELINGS OF UNSTEADINESS: 0
DEPRESSION: 0

## 2024-11-26 NOTE — PROGRESS NOTES
"Subjective   Patient ID: Svetlana Nobles is a 49 y.o. female who presents for Follow-up.    Patient is coming in for annual physical exam.  She follows with OB/GYN for age-appropriate routine female exam and test.  She is up-to-date with her age-appropriate female screening exams.  Her most recent lab results show low serum vitamin D level of 18.  She tells me that she completed Cologuard yesterday and the results are pending.  She complained of feeling cold all the time and low energy.  Her blood pressure is elevated at 162/94. She is asymptomatic and vehemently declined treatment.  During her last office visit, she was started on pantoprazole for acid reflux disease. Reports she stopped taking pantoprazole after she felt better.  Otherwise, denies acute medical complaint.         Review of Systems   All other systems reviewed and are negative.      Objective   BP (!) 162/94   Pulse 85   Ht 1.613 m (5' 3.5\")   Wt 54 kg (119 lb)   LMP 10/01/2017 (Approximate)   SpO2 98%   BMI 20.75 kg/m²     Physical Exam  Vitals reviewed.   Constitutional:       Appearance: Normal appearance.   HENT:      Head: Normocephalic and atraumatic.      Right Ear: External ear normal.      Left Ear: External ear normal.      Nose: Nose normal.      Mouth/Throat:      Mouth: Mucous membranes are moist.   Eyes:      Extraocular Movements: Extraocular movements intact.      Conjunctiva/sclera: Conjunctivae normal.      Pupils: Pupils are equal, round, and reactive to light.   Cardiovascular:      Rate and Rhythm: Normal rate and regular rhythm.      Pulses: Normal pulses.      Heart sounds: Normal heart sounds.   Pulmonary:      Effort: Pulmonary effort is normal.      Breath sounds: Normal breath sounds.   Abdominal:      General: Abdomen is flat. Bowel sounds are normal.      Palpations: Abdomen is soft.   Musculoskeletal:      Cervical back: Neck supple.   Skin:     General: Skin is warm and dry.   Neurological:      General: No focal " deficit present.      Mental Status: She is alert and oriented to person, place, and time.   Psychiatric:         Mood and Affect: Mood normal.         Behavior: Behavior normal.         Thought Content: Thought content normal.         Judgment: Judgment normal.         Assessment/Plan   Problem List Items Addressed This Visit       Annual physical exam

## 2024-11-26 NOTE — PATIENT INSTRUCTIONS
Your most recent lab results shows very low serum vitamin D level of 18.  I have recommend that you start taking OTC vitamin D3 1000 units daily. Continue taking all other medications as prescribed and complete labs a few days prior to follow-up in a year for annual physical exam.

## 2024-12-02 LAB — NONINV COLON CA DNA+OCC BLD SCRN STL QL: NEGATIVE

## 2024-12-04 NOTE — PROGRESS NOTES
EDVIN Nobles is a 49 y.o. 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     OBJECTIVE  There were no vitals taken for this visit.     General:   Alert and oriented, in no acute distress   Neck: Supple. No visible thyromegaly.    Abdomen: Soft, non-tender, without masses or organomegaly   Skin: Dry and warm, no lesions noted   Musculoskeletal:  Normal range of motion, normal gait , no muscle weakness   Psych Normal affect. Normal mood.      Problem List Items Addressed This Visit    None     IMPRESSIONS:  Narrative & Impression   Interpreted By:  Clarence Owens,   STUDY:  US PELVIS TRANSABDOMINAL WITH TRANSVAGINAL;  11/7/2024 3:50 pm      INDICATION:  Signs/Symptoms:PCOS work up.      ,N91.2 Amenorrhea, unspecified      COMPARISON:  None.      ACCESSION NUMBER(S):  [id number]      ORDERING CLINICIAN:  BART DUMONT      TECHNIQUE:  Multiple multiplanar static gray scale, color and spectral waveform  sonographic images of the pelvis were obtained.      FINDINGS:  UTERUS:  The uterus measures  4.2 x 3.0 x 5.4 cm. The uterine myometrium  appears unremarkable.      ENDOMETRIUM:  The endometrium measures a thickness of 0.4 cm, which is normal.      RIGHT ADNEXA:  The right ovary measures 1.2 x 1.9 x 1.3 and  demonstrates normal  flow. No gross right adnexal masses are seen, no hydrosalpinx.      LEFT ADNEXA:  The left ovary is not definitively visualized. No gross left adnexal  masses are seen, no hydrosalpinx.      CUL DE SAC:  No gross free fluid is seen in the pelvic cul-de-sac.      IMPRESSION:  1. Unremarkable pelvic ultrasound within limitations of non  visualization of the left ovary.           There are no diagnoses linked to this encounter.     Marisol Ramsay MD

## 2024-12-05 ENCOUNTER — APPOINTMENT (OUTPATIENT)
Dept: OBSTETRICS AND GYNECOLOGY | Facility: CLINIC | Age: 49
End: 2024-12-05
Payer: COMMERCIAL

## 2024-12-05 VITALS — DIASTOLIC BLOOD PRESSURE: 102 MMHG | SYSTOLIC BLOOD PRESSURE: 168 MMHG | BODY MASS INDEX: 20.4 KG/M2 | WEIGHT: 117 LBS

## 2024-12-05 DIAGNOSIS — R87.612 LGSIL ON PAP SMEAR OF CERVIX: ICD-10-CM

## 2024-12-05 DIAGNOSIS — R93.89 ENDOMETRIAL THICKENING ON ULTRASOUND: Primary | ICD-10-CM

## 2024-12-05 DIAGNOSIS — N91.1 AMENORRHEA, SECONDARY: ICD-10-CM

## 2024-12-05 DIAGNOSIS — E28.319 PREMATURE MENOPAUSE: ICD-10-CM

## 2024-12-05 DIAGNOSIS — N89.8 VAGINAL DISCHARGE: ICD-10-CM

## 2024-12-05 PROCEDURE — 58110 BX DONE W/COLPOSCOPY ADD-ON: CPT | Performed by: OBSTETRICS & GYNECOLOGY

## 2024-12-05 PROCEDURE — 57454 BX/CURETT OF CERVIX W/SCOPE: CPT | Performed by: OBSTETRICS & GYNECOLOGY

## 2024-12-05 PROCEDURE — 87205 SMEAR GRAM STAIN: CPT

## 2024-12-05 PROCEDURE — 3077F SYST BP >= 140 MM HG: CPT | Performed by: OBSTETRICS & GYNECOLOGY

## 2024-12-05 PROCEDURE — 3080F DIAST BP >= 90 MM HG: CPT | Performed by: OBSTETRICS & GYNECOLOGY

## 2024-12-05 NOTE — PROGRESS NOTES
Patient ID: Svetlana Nobles is a 49 y.o. female, here for colposcopy an dEMB  H/o LGSIL on PAP and postmenopausal thickened endometrium    Colposcopy    Date/Time: 12/5/2024 5:13 PM    Performed by: Marisol Ramsay MD  Authorized by: Marisol Ramsay MD    Procedure location: cervix    Consent:     Patient questions answered: yes      Risks and benefits of the procedure and its alternatives discussed: yes      Procedural risks discussed:  Bleeding and infection    Consent obtained:  Written    Consent given by:  Patient  Indication:     Cervical indication(s): cervical LSIL    Pre-procedure:     Premeds:  Ibuprofen    Speculum was placed in the vagina: yes      Prep solution(s): acetic acid    Procedure:     Colposcopy with: cervical biopsy and endometrial biopsy      Biopsy taken: yes      # of biopsies:  2    Biopsy location(s): 9 and 3 o'clock    Cervix visibility: fully visualized      SCJ visibility: fully visualized      Lesion visualized: fully visualized      Acetowhite lesion(s): cervix      Cervical impression: low grade      Ferric subsulfate solution applied: no      Tampon inserted: no    Post-procedure:     Patient tolerance of procedure:  Procedure terminated at patient's request    Estimated blood loss (mL):  1    Instructions and paperwork completed: yes      Educational handouts given: yes    Endometrial biopsy    Date/Time: 12/5/2024 5:21 PM    Performed by: Marisol Ramsay MD  Authorized by: Marisol Ramsay MD    Consent:     Consent obtained: written    Consent given by: patient    Risks discussed: bleeding, infection and pain    Patient agrees, verbalizes understanding, and wants to proceed: yes      Procedure explained and questions answered to patient or proxy's satisfaction: yes    Indications:     Indications: thickened endometrium    Pre-procedure:     Urine pregnancy test: N/A      Premeds: NSAID    Procedure:     Prepped with: Betadine    Tenaculum used: yes      A local block was performed:  no      Cervix dilated: no      Number of passes: 1  Findings:     Cervix: normal      Uterus depth by sound (cm): 6    Specimen collected: low volume sample collected      Patient tolerance: tolerated with difficulty

## 2024-12-06 LAB
CLUE CELLS VAG LPF-#/AREA: NORMAL /[LPF]
NUGENT SCORE: 0
YEAST VAG WET PREP-#/AREA: NORMAL

## 2024-12-12 LAB
LABORATORY COMMENT REPORT: NORMAL
PATH REPORT.COMMENTS IMP SPEC: NORMAL
PATH REPORT.FINAL DX SPEC: NORMAL
PATH REPORT.GROSS SPEC: NORMAL
PATH REPORT.RELEVANT HX SPEC: NORMAL
PATH REPORT.TOTAL CANCER: NORMAL

## 2025-01-02 ENCOUNTER — TELEPHONE (OUTPATIENT)
Facility: CLINIC | Age: 50
End: 2025-01-02
Payer: COMMERCIAL

## 2025-01-09 NOTE — PROGRESS NOTES
SUBJECTIVE  Svetlana Nobles is a 49 y.o. female here for GYN follow-up  History of LGSIL and thickened endometrium on ultrasound  Surgical pathology negative for dysplasia and positive for inactive endometrium consistent with patient's history of menopause.  All labs were also consistent with menopause  # 1 - Date: None, Sex: None, Weight: None, GA: None, Type: None, Apgar1: None, Apgar5: None, Living: None, Birth Comments: None    # 2 - Date: None, Sex: None, Weight: None, GA: None, Type: None, Apgar1: None, Apgar5: None, Living: None, Birth Comments: None      Gyn:   Menstrual Pattern: none  STIs: denies  Sexual Activity: men, monogamous, no complaints  Contraception: None    [unfilled]  Past Medical History:   Diagnosis Date    Benign intracranial hypertension 12/01/2020    Pseudotumor cerebri    Narcolepsy without cataplexy ([facility]) 12/01/2020    Narcolepsy    Personal history of other complications of pregnancy, childbirth and the puerperium 12/01/2020    History of postpartum depression    Personal history of other diseases of the digestive system 12/01/2020    History of irritable bowel syndrome    Personal history of other endocrine, nutritional and metabolic disease 12/01/2020    History of Graves' disease      History reviewed. No pertinent surgical history.     OBJECTIVE  BP (!) 140/98   Wt 55.3 kg (122 lb)   LMP 10/01/2017 (Approximate)   BMI 21.27 kg/m²      General:   Alert and oriented, in no acute distress   Neck: Supple. No visible thyromegaly.    Skin: Warm and supple, no abnormal lesions noted   Musculoskeletal: Normal range of motion, normal gait, no muscle weakness   Neurological: Oriented x 3, no focal deficits   Psych Normal affect. Normal mood.      Problem List Items Addressed This Visit       Premature menopause - Primary    Chronic fatigue    Encounter to discuss test results    Hair loss    Menopausal and perimenopausal disorder      IMPRESSIONS:  Patient that all her labs and endometrial  biopsy findings support menopause.  Reviewed symptoms associated with the menopausal disorder, this matches most of her symptoms.  Hair loss may be secondary to thyroid disorder or nonspecific, she is unable to tolerate Nutrafol supplements.  Discussed biotin for hair loss  Chronic fatigue, which could be nonspecific or related to menopausal disorder, discussed vitamin B complex supplements.  She is already on vitamin D.  Will repeat Pap in 1 year.  More than 50% of time was utilized for counseling  There are no diagnoses linked to this encounter.     Marisol Ramsay MD

## 2025-01-10 ENCOUNTER — APPOINTMENT (OUTPATIENT)
Dept: OBSTETRICS AND GYNECOLOGY | Facility: CLINIC | Age: 50
End: 2025-01-10
Payer: COMMERCIAL

## 2025-01-10 VITALS — WEIGHT: 122 LBS | DIASTOLIC BLOOD PRESSURE: 98 MMHG | SYSTOLIC BLOOD PRESSURE: 140 MMHG | BODY MASS INDEX: 21.27 KG/M2

## 2025-01-10 DIAGNOSIS — N95.9 MENOPAUSAL AND PERIMENOPAUSAL DISORDER: ICD-10-CM

## 2025-01-10 DIAGNOSIS — E28.319 PREMATURE MENOPAUSE: Primary | ICD-10-CM

## 2025-01-10 DIAGNOSIS — R53.82 CHRONIC FATIGUE: ICD-10-CM

## 2025-01-10 DIAGNOSIS — Z71.2 ENCOUNTER TO DISCUSS TEST RESULTS: ICD-10-CM

## 2025-01-10 DIAGNOSIS — L65.9 HAIR LOSS: ICD-10-CM

## 2025-01-10 PROCEDURE — 3080F DIAST BP >= 90 MM HG: CPT | Performed by: OBSTETRICS & GYNECOLOGY

## 2025-01-10 PROCEDURE — 99214 OFFICE O/P EST MOD 30 MIN: CPT | Performed by: OBSTETRICS & GYNECOLOGY

## 2025-01-10 PROCEDURE — 3077F SYST BP >= 140 MM HG: CPT | Performed by: OBSTETRICS & GYNECOLOGY

## 2025-07-08 DIAGNOSIS — Z12.31 ENCOUNTER FOR SCREENING MAMMOGRAM FOR BREAST CANCER: ICD-10-CM

## 2025-11-26 ENCOUNTER — APPOINTMENT (OUTPATIENT)
Dept: PRIMARY CARE | Facility: CLINIC | Age: 50
End: 2025-11-26
Payer: COMMERCIAL

## 2026-02-02 ENCOUNTER — APPOINTMENT (OUTPATIENT)
Dept: OBSTETRICS AND GYNECOLOGY | Facility: CLINIC | Age: 51
End: 2026-02-02
Payer: COMMERCIAL